# Patient Record
Sex: MALE | Race: WHITE | NOT HISPANIC OR LATINO | Employment: FULL TIME | URBAN - NONMETROPOLITAN AREA
[De-identification: names, ages, dates, MRNs, and addresses within clinical notes are randomized per-mention and may not be internally consistent; named-entity substitution may affect disease eponyms.]

---

## 2024-08-16 ENCOUNTER — APPOINTMENT (OUTPATIENT)
Dept: GENERAL RADIOLOGY | Facility: HOSPITAL | Age: 19
End: 2024-08-16
Payer: OTHER MISCELLANEOUS

## 2024-08-16 ENCOUNTER — HOSPITAL ENCOUNTER (EMERGENCY)
Facility: HOSPITAL | Age: 19
Discharge: HOME | End: 2024-08-16
Payer: OTHER MISCELLANEOUS

## 2024-08-16 VITALS
BODY MASS INDEX: 25.6 KG/M2 | WEIGHT: 189 LBS | TEMPERATURE: 98 F | RESPIRATION RATE: 18 BRPM | DIASTOLIC BLOOD PRESSURE: 78 MMHG | SYSTOLIC BLOOD PRESSURE: 150 MMHG | OXYGEN SATURATION: 97 % | HEIGHT: 72 IN | HEART RATE: 78 BPM

## 2024-08-16 DIAGNOSIS — S82.892A CLOSED AVULSION FRACTURE OF LEFT ANKLE, INITIAL ENCOUNTER: Primary | ICD-10-CM

## 2024-08-16 PROCEDURE — 99283 EMERGENCY DEPT VISIT LOW MDM: CPT

## 2024-08-16 PROCEDURE — 73610 X-RAY EXAM OF ANKLE: CPT

## 2024-08-16 PROCEDURE — 73590 X-RAY EXAM OF LOWER LEG: CPT

## 2024-08-16 RX ORDER — TIZANIDINE 4 MG/1
4 TABLET ORAL EVERY 6 HOURS PRN
Qty: 12 TABLET | Refills: 0 | Status: SHIPPED | OUTPATIENT
Start: 2024-08-16

## 2024-08-16 RX ORDER — MELOXICAM 7.5 MG/1
7.5 TABLET ORAL DAILY
Qty: 5 TABLET | Refills: 0 | Status: SHIPPED | OUTPATIENT
Start: 2024-08-16

## 2024-08-16 NOTE — Clinical Note
[facility] EMERGENCY DEPARTMENT  [address]  [facility] [address]  Phone: [phone]    Joceline Urban was seen and treated in our emergency department on 8/16/2024.  He may return to work on 08/26/2024.         Thank you for choosing [facility].    Gamaliel Bar PA-C

## 2024-08-17 NOTE — DISCHARGE INSTRUCTIONS
As we discussed you have a 7 mm fracture of your lateral malleoli on your ankle.  Please rest, ice, elevate the ankle is much as possible over the weekend.  You will need to follow-up with the orthopedic [place] within the next 48 hours for continued outpatient evaluation.  Please continue to use anti-inflammatories as needed, muscle laxer's as needed per  Should you develop any new or worsening symptoms please return to the ER for further evaluation

## 2024-08-17 NOTE — ED PROVIDER NOTES
Subjective   History of Present Illness    Patient is a 18-year-old male presenting to ED with left ankle injury.  PMH unremarkable.  Patient states earlier this afternoon around 4 PM he was walking on a graded floor at work where he is a  when he missed stepped causing him to invert his left ankle.  Patient states that he was then lowered down to the ground however he denies any further injury sustained in the event including head injury or loss of consciousness.  Patient states he has been able to walk on the ankle but is concerned because he has a history of numerous previous ankle fractures.  Patient denies numbness or weakness of the extremities, medication use prior to arrival, and presents at this time for further evaluation.    Records reviewed show patient last in the outpatient setting at the PCP office on 7/29/2024 for health examination, pain in right foot, cardiovascular screening.    No previous ED visits.    No previous lower extremity MSK imaging.    Review of Systems   Constitutional: Negative.    HENT: Negative.     Eyes: Negative.    Respiratory: Negative.     Cardiovascular: Negative.    Gastrointestinal: Negative.    Genitourinary: Negative.    Musculoskeletal:  Positive for arthralgias (Left ankle) and joint swelling (Left lateral ankle). Negative for gait problem (Increased pain with ambulation but no difficulty with gait) and neck pain.   Skin: Negative.  Negative for wound.   Neurological: Negative.  Negative for weakness and numbness.   Psychiatric/Behavioral: Negative.     All other systems reviewed and are negative.      No past medical history on file.    No Known Allergies    No past surgical history on file.    No family history on file.    Social History     Socioeconomic History    Marital status:            Objective   Physical Exam  Vitals and nursing note reviewed.   Constitutional:       General: He is not in acute distress.     Appearance: Normal  appearance. He is normal weight. He is not ill-appearing, toxic-appearing or diaphoretic.   HENT:      Head: Normocephalic and atraumatic.      Mouth/Throat:      Mouth: Mucous membranes are moist.      Pharynx: Oropharynx is clear.   Cardiovascular:      Rate and Rhythm: Normal rate.      Pulses: Normal pulses.           Popliteal pulses are 2+ on the right side and 2+ on the left side.        Dorsalis pedis pulses are 2+ on the right side and 2+ on the left side.        Posterior tibial pulses are 2+ on the right side and 2+ on the left side.   Pulmonary:      Effort: Pulmonary effort is normal.   Abdominal:      Palpations: Abdomen is soft.   Musculoskeletal:         General: Swelling and tenderness present. No deformity. Normal range of motion.      Cervical back: Normal range of motion and neck supple.      Comments: Tenderness and mild swelling over the left lateral malleoli with limited range of motion of the joint actively due to pain.  No medial malleoli tenderness or swelling.  Distal and proximal to this there is no further bony tenderness, joint swelling in the left lower extremity.  No skin injuries including abrasions, bruising, lacerations.  Bilateral lower extremities are neurovascular tact distally.  No spinal midline or paraspinal muscular abnormalities.   Skin:     General: Skin is warm and dry.      Findings: No abrasion, bruising, signs of injury or laceration.   Neurological:      Mental Status: He is alert and oriented to person, place, and time.      Sensory: No sensory deficit.   Psychiatric:         Mood and Affect: Mood normal.         Behavior: Behavior normal. Behavior is cooperative.         Procedures           ED Course                                             Medical Decision Making  Problems Addressed:  Closed avulsion fracture of left ankle, initial encounter: complicated acute illness or injury    Amount and/or Complexity of Data Reviewed  External Data Reviewed: labs,  radiology and notes.  Radiology: ordered. Decision-making details documented in ED Course.    Risk  OTC drugs.  Prescription drug management.      Patient is a 18-year-old male presenting to ED with left ankle injury.  PMH unremarkable.  Upon initial evaluation patient resting comfortably in the bed in no acute distress.  Initial vital signs with hypertensive systolic pressures in the 150s and otherwise unremarkable vital signs.  Examination finds tenderness and mild swelling over the left lateral malleoli with limited range of motion of the joint actively due to pain.  No medial malleoli tenderness or swelling.  Distal and proximal to this there is no further bony tenderness, joint swelling in the left lower extremity.  No skin injuries including abrasions, bruising, lacerations.  Bilateral lower extremities are neurovascular tact distally.  No spinal midline or paraspinal muscular abnormalities.  No further MSK, dermatological, or examination findings.  Discussed with patient need for x-ray imaging and offered medications to include anti-inflammatories for which patient declines at this time but is otherwise amenable to treatment planning with no further questions, concerns, needs at this time.    Differential diagnosis: Distal tibia fracture, distal fibula fracture, avulsion fracture, ankle dislocation, other fracture, calcaneal fracture, metatarsal fracture, other    Left tibia-fibula x-ray showed: 7 mm avulsion fracture fragment inferior to lateral malleoli with associated soft tissue swelling.  Left ankle x-ray showed: 7 x 3 mm avulsion fracture fragment inferior to the lateral malleoli with associated soft tissue swelling.  Patient continued to decline any need for medications for pain or discomfort.  Patient was placed in a long-leg posterior splint by Bennett WATSON after which his left lower extremity remain neurovascular tact distally.  Discussed with patient need for rest, ice, elevation as well as outpatient  follow-up with orthopedic [place] within the next 48 hours.  Discussed strict return precautions and need for immediate return to ED should he develop any new or worsening symptoms.  Patient is appreciative with no further questions, concerns, needs at this time and is stable for discharge.    Final diagnoses:   Closed avulsion fracture of left ankle, initial encounter       ED Disposition  ED Disposition       ED Disposition   Discharge    Condition   Stable    Comment   --               Provider, No Known  [facility] [address]  [phone]    Schedule an appointment as soon as possible for a visit in 2 days      PATIENT CONNECTION - [place] [address]  [phone]  Schedule an appointment as soon as possible for a visit in 2 days      [facility] EMERGENCY DEPARTMENT  [address] [facility] [id number] [phone]    As needed    Twan Giordano MD  [address] [facility] [id number] [phone]    Schedule an appointment as soon as possible for a visit in 2 days           Medication List        New Prescriptions      meloxicam 7.5 MG tablet  Commonly known as: MOBIC  Take 1 tablet by mouth Daily.     tiZANidine 4 MG tablet  Commonly known as: Zanaflex  Take 1 tablet by mouth Every 6 (Six) Hours As Needed for Muscle Spasms.               Where to Get Your Medications        These medications were sent to [facility]/pharmacy #[id number] - [facility] - [address] AT ACROSS FROM [address]A [address] - [phone]  - [phone]   [address] [facility] [address]      Phone: [phone]   meloxicam 7.5 MG tablet  tiZANidine 4 MG tablet            Gamaliel Bar PA-C  08/17/24 0114

## 2024-08-19 ENCOUNTER — TELEPHONE (OUTPATIENT)
Dept: FAMILY MEDICINE CLINIC | Facility: CLINIC | Age: 19
End: 2024-08-19
Payer: OTHER MISCELLANEOUS

## 2024-08-19 NOTE — TELEPHONE ENCOUNTER
Called patient to tell him about [facility].He was recently seen in the ER and may need a PCP. Patient said he isn't looking for one at this time. I told him to call or stop by if he has any questions.

## 2024-08-29 ENCOUNTER — APPOINTMENT (OUTPATIENT)
Dept: GENERAL RADIOLOGY | Facility: HOSPITAL | Age: 19
End: 2024-08-29
Payer: OTHER MISCELLANEOUS

## 2024-08-29 ENCOUNTER — HOSPITAL ENCOUNTER (EMERGENCY)
Facility: HOSPITAL | Age: 19
Discharge: HOME | End: 2024-08-29
Payer: OTHER MISCELLANEOUS

## 2024-08-29 VITALS
SYSTOLIC BLOOD PRESSURE: 144 MMHG | BODY MASS INDEX: 25.6 KG/M2 | DIASTOLIC BLOOD PRESSURE: 74 MMHG | OXYGEN SATURATION: 99 % | WEIGHT: 189 LBS | TEMPERATURE: 97 F | HEIGHT: 72 IN | HEART RATE: 99 BPM | RESPIRATION RATE: 20 BRPM

## 2024-08-29 DIAGNOSIS — M25.572 LEFT ANKLE PAIN, UNSPECIFIED CHRONICITY: Primary | ICD-10-CM

## 2024-08-29 PROCEDURE — 99283 EMERGENCY DEPT VISIT LOW MDM: CPT

## 2024-08-29 PROCEDURE — 73590 X-RAY EXAM OF LOWER LEG: CPT

## 2024-08-29 PROCEDURE — 73610 X-RAY EXAM OF ANKLE: CPT

## 2024-08-29 NOTE — Clinical Note
[facility] EMERGENCY DEPARTMENT  [address] [place] [address] AVE  [facility] [address]  Phone: [phone]    Joceline Urban was seen and treated in our emergency department on 8/29/2024.  He may return to work on 08/30/2024.         Thank you for choosing [facility].    Oskar Rainey, APRN

## 2024-08-29 NOTE — ED PROVIDER NOTES
Subjective   History of Present Illness  Patient is a 18-year-old male that presents to the emergency department with worsening pain to the left ankle.  Patient was seen in this ED on 8/16/2024 and diagnosed with a 7 mm avulsion fracture inferior to lateral malleolus.  Patient was placed in splint and provided with crutches and told to follow-up with orthopedic [place].  Patient reports he followed up with orthopedic [place] and was placed in a walking boot and told he could bear weight as tolerated.  Patient states since being placed in walking boot he has continued to have increased pain to the lateral aspect of the left ankle and reports increased swelling.  Patient denies any new fall, trauma, or injury.  Patient states he has not reached out to the orthopedic [place] regarding the new complaints but states he is scheduled to see them in office in 2 weeks.  Patient denies any numbness, tingling, or loss of sensation to the affected extremity.  Denies any fevers, body aches, chills, cough or congestion.  Denies any lightheadedness, dizziness, blurred vision, headache or near syncope.  Denies any chest pain, shortness of breath, abdominal pain, nausea, vomiting, constipation or diarrhea.          Review of Systems   Musculoskeletal:  Positive for arthralgias, gait problem, joint swelling and myalgias.   All other systems reviewed and are negative.      History reviewed. No pertinent past medical history.    No Known Allergies    History reviewed. No pertinent surgical history.    History reviewed. No pertinent family history.    Social History     Socioeconomic History    Marital status:            Objective   Physical Exam  Vitals and nursing note reviewed.   Constitutional:       Appearance: Normal appearance.      Comments: Nontoxic appearing. In no acute distress.    HENT:      Head: Normocephalic and atraumatic.      Right Ear: External ear normal.      Left Ear: External ear normal.      Nose:  Nose normal.      Mouth/Throat:      Mouth: Mucous membranes are moist.      Pharynx: Oropharynx is clear.   Eyes:      Extraocular Movements: Extraocular movements intact.      Conjunctiva/sclera: Conjunctivae normal.      Pupils: Pupils are equal, round, and reactive to light.   Cardiovascular:      Rate and Rhythm: Normal rate and regular rhythm.      Pulses: Normal pulses.      Heart sounds: Normal heart sounds.   Pulmonary:      Effort: Pulmonary effort is normal. No respiratory distress.      Breath sounds: Normal breath sounds. No wheezing.   Chest:      Chest wall: No tenderness.   Abdominal:      General: Abdomen is flat. Bowel sounds are normal. There is no distension.      Palpations: Abdomen is soft.      Tenderness: There is no abdominal tenderness. There is no right CVA tenderness, left CVA tenderness, guarding or rebound.   Musculoskeletal:         General: Swelling and tenderness present. Normal range of motion.      Cervical back: Normal range of motion and neck supple.      Right lower leg: No edema.      Left lower leg: No edema.      Comments: Patient reports tenderness upon palpation to the lateral aspect of the left ankle.  No obvious or significant swelling noted to the left lower extremity.  Patient is able to ambulate with walking boot and has normal gait but reports increased pain with ambulation to the lateral ankle.  Patient denies any numbness, tingling, or loss of sensation to the affected extremity.  Dorsalis pedis pulses are palpable, strong equal bilaterally.  No obvious bruising, abrasions, or deformity noted upon exam.  Patient is neurovascular intact.   Skin:     General: Skin is warm and dry.      Capillary Refill: Capillary refill takes less than 2 seconds.   Neurological:      General: No focal deficit present.      Mental Status: He is alert and oriented to person, place, and time. Mental status is at baseline.   Psychiatric:         Mood and Affect: Mood normal.          Behavior: Behavior normal.         Thought Content: Thought content normal.         Judgment: Judgment normal.       XR Tibia Fibula 2 View Left   Final Result   1. No acute fracture.                   This report was signed and finalized on 8/29/2024 11:30 AM by Dr. Noemi Snow MD.          XR Ankle 3+ View Left   Final Result   1. Ossific densities distal to the lateral malleolus are unchanged.   Lateral soft tissue swelling has slightly improved.       This report was signed and finalized on 8/29/2024 11:36 AM by Jesus Monahan.               Procedures           ED Course                                             Medical Decision Making  Joceline Urban is a 18 y.o. male who presents to the ED with worsening pain to the left ankle.  Patient was seen in this ED on 8/16/2024 and diagnosed with a 7 mm avulsion fracture inferior to lateral malleolus.  Patient was placed in splint and provided with crutches and told to follow-up with orthopedic [place].  Patient reports he followed up with orthopedic [place] and was placed in a walking boot and told he could bear weight as tolerated.  Patient states since being placed in walking boot he has continued to have increased pain to the lateral aspect of the left ankle and reports increased swelling.  Patient denies any new fall, trauma, or injury.  Patient states he has not reached out to the orthopedic [facility] regarding the new complaints but states he is scheduled to see them in office in 2 weeks.  Patient denies any numbness, tingling, or loss of sensation to the affected extremity.  Denies any fevers, body aches, chills, cough or congestion.  Denies any lightheadedness, dizziness, blurred vision, headache or near syncope.  Denies any chest pain, shortness of breath, abdominal pain, nausea, vomiting, constipation or diarrhea.    Patient was non-toxic appearing on arrival. No acute distress was noted.  Vital signs stable.     Patient's presentation  raises suspicion for differentials including, but not limited to, new fracture, ligamentous injury, fracture.    Past medical history, surgical history, and medication regimen reviewed.     Previous notes, labs, imaging and more reviewed.    Please refer to above section of note for imaging results that were reviewed and interpreted by radiology as well as attending physician.     Given findings described above, patient's presentation is likely consistent with left ankle pain.       I had an in-depth discussion with the patient regarding improvement in x-ray imaging results compared to previous ED encounter.  Discussed that patient should continue with walking boot until follow-up with orthopedics.  Informed patient he may call their office to discuss if sooner follow-up is recommended.  Patient was educated on concerning signs and symptoms that would warrant a quick return to the ED and verbalized understanding of this. I answered all the questions regarding the emergency department evaluation, diagnosis, and treatment plan in plain and simple language that was understandable. We discussed that due to always having some diagnostic uncertainty while in the ER, there is always a chance that symptoms may change or new symptoms may reveal themselves after being discharged. Because of this, I stressed the importance of Joceline following up with their PCP and orthopedics. Patient informed that appointment will need to be done by calling their office to set up an appointment within the next few days or as soon as reasonably possible so that the symptoms can be re-evaluated for improvement or for any other questions. I also gave Joceline common sense return precautions and prompted patient to return to the emergency department within 24 - 48hrs if there are any new, worsening, or concerning symptoms. The patient verbalized understanding of the discharge instructions and agreed with them. Joceline was discharged in stable  condition.     Dragon disclaimer:  Parts of this note may be an electronic transcription/translation of spoken language to printed text using the Dragon dictation system.    Problems Addressed:  Left ankle pain, unspecified chronicity: complicated acute illness or injury    Amount and/or Complexity of Data Reviewed  Radiology: ordered.        Final diagnoses:   Left ankle pain, unspecified chronicity       ED Disposition  ED Disposition       ED Disposition   Discharge    Condition   Stable    Comment   --               [facility] ORTHOPEDICS  [address] [facility] [address] [phone]  Schedule an appointment as soon as possible for a visit       [facility] EMERGENCY DEPARTMENT  [address] [place] [id number] [phone]    If symptoms worsen         Medication List      No changes were made to your prescriptions during this visit.            Oskar Rainey, APRN  08/29/24 1528

## 2024-08-29 NOTE — DISCHARGE INSTRUCTIONS
It was very nice to meet you, Ashvingaye. Thank you for allowing us to take care of you today at [facility].    Today you were seen in the emergency department for your symptoms. Please understand that an ER evaluation is just the start of your evaluation. We do the best we can, but we are often unable to fully find what is causing your symptoms from one evaluation.  Because of this, the goal is to determine whether you need to be evaluated in the hospital or if it is safe for you to go home and see other doctors provided such as primary care physicians or specialist on an outpatient basis.     Like we discussed, I strongly urge that you follow up with your primary care doctor and orthopedics. Please call their office to set up an appointment as soon as possible so that you can be re-evaluated for improvement in your symptoms or for any other questions.  I have provided the information needed, including phone number, to call to set up an appointment below in these discharge papers.     Educational material has also been provided in the following pages regarding what we have discussed today.     Please return to the emergency room within 12-48 hours if you experience symptoms such as the following:   Fever, chills, chest pain or shortness of breath, pain with inspiration/expiration, pain that travels to your arms, neck or back, nausea, vomiting, severe headache, tearing pain in your chest, dizziness, feel as though you are about to pass out, OR if you have any worsening symptoms, or any other concerns.

## 2024-10-15 ENCOUNTER — TELEPHONE (OUTPATIENT)
Age: 19
End: 2024-10-15

## 2024-10-15 NOTE — TELEPHONE ENCOUNTER
----- Message from Roslyn DUVAL sent at 10/15/2024  9:14 AM CDT -----  Regarding: [facility] General  [facility] General    Reason for Message: [facility] workers comp carrier is needing Jaken's: Date of last office visit, next office visit, and current work status update. Claim # [id number]    Additional Information: Please contact Maryam escobare. Fax # [phone]    Call Back Number: [phone]  Caller Relationship: Other/ work comp   Caller Name: Maryam Laughlin;  Email: [email or web address]

## 2024-10-16 ENCOUNTER — TELEPHONE (OUTPATIENT)
Age: 19
End: 2024-10-16

## 2024-10-16 DIAGNOSIS — M25.572 ACUTE LEFT ANKLE PAIN: Primary | ICD-10-CM

## 2024-10-17 ENCOUNTER — OFFICE VISIT (OUTPATIENT)
Age: 19
End: 2024-10-17
Payer: COMMERCIAL

## 2024-10-17 DIAGNOSIS — S82.62XA AVULSION FRACTURE OF LATERAL MALLEOLUS OF LEFT FIBULA, CLOSED, INITIAL ENCOUNTER: Primary | ICD-10-CM

## 2024-10-17 PROCEDURE — 99203 OFFICE O/P NEW LOW 30 MIN: CPT | Performed by: PHYSICIAN ASSISTANT

## 2024-10-17 NOTE — PROGRESS NOTES
Orthopaedic History and Physical - New Patient    NAME:  Vernon Hernandez   : 2005  MRN: [id number]      10/17/2024     CHIEF COMPLAINT: Left lateral malleolus avulsion fracture    HISTORY OF PRESENT ILLNESS:   Is a pleasant 19-year-old comes in for his new patient for evaluation of: An avulsion fracture of the left lateral malleolus.  This occurred at work.  Patient is a  at [employer].Date of injury 2024.  Patient reports missing a step causing him to invert his left ankle.  Patient was seen at [facility] emergency room where x-rays revealed an avulsion fracture off his left lateral malleolus.  Patient was put in a boot and told to follow-up here.  Patient is here today for follow-up.  Patient has been out of work since the date of injury.  He is reporting no complaints of pain or discomfort today.  He has been in regular shoes now over a week.  He denies instability.  Patient states he is ready to return to work.      Past Medical History:    No past medical history on file.    Past Surgical History:    No past surgical history on file.    Current Medications:   Prior to Admission medications    Not on File       Allergies:  Patient has no allergy information on record.    Social History:   Social History     Socioeconomic History    Marital status:      Spouse name: Not on file    Number of children: Not on file    Years of education: Not on file    Highest education level: Not on file   Occupational History    Not on file   Tobacco Use    Smoking status: Not on file    Smokeless tobacco: Not on file   Substance and Sexual Activity    Alcohol use: Not on file    Drug use: Not on file    Sexual activity: Not on file   Other Topics Concern    Not on file   Social History Narrative    Not on file     Social Determinants of Health     Financial Resource Strain: Not on file   Food Insecurity: Not on file   Transportation Needs: Not on file   Physical